# Patient Record
Sex: MALE | Race: WHITE | HISPANIC OR LATINO | URBAN - METROPOLITAN AREA
[De-identification: names, ages, dates, MRNs, and addresses within clinical notes are randomized per-mention and may not be internally consistent; named-entity substitution may affect disease eponyms.]

---

## 2019-12-18 ENCOUNTER — OFFICE VISIT (OUTPATIENT)
Dept: URBAN - METROPOLITAN AREA CLINIC 45 | Facility: CLINIC | Age: 84
End: 2019-12-18
Payer: COMMERCIAL

## 2019-12-18 PROCEDURE — 92133 CPTRZD OPH DX IMG PST SGM ON: CPT | Performed by: OPTOMETRIST

## 2019-12-18 PROCEDURE — 92014 COMPRE OPH EXAM EST PT 1/>: CPT | Performed by: OPTOMETRIST

## 2019-12-18 RX ORDER — BRIMONIDINE TARTRATE, TIMOLOL MALEATE 2; 5 MG/ML; MG/ML
SOLUTION/ DROPS OPHTHALMIC
Qty: 15 | Refills: 1 | Status: INACTIVE
Start: 2019-12-18 | End: 2019-12-26

## 2019-12-18 RX ORDER — BIMATOPROST 0.1 MG/ML
0.01 % SOLUTION/ DROPS OPHTHALMIC
Qty: 7.5 | Refills: 4 | Status: INACTIVE
Start: 2019-12-18 | End: 2020-04-21

## 2019-12-18 RX ORDER — BRINZOLAMIDE 10 MG/ML
1 % SUSPENSION/ DROPS OPHTHALMIC
Qty: 15 | Refills: 3 | Status: INACTIVE
Start: 2019-12-18 | End: 2020-04-13

## 2019-12-18 ASSESSMENT — KERATOMETRY
OS: 43.38
OD: 43.75

## 2019-12-18 ASSESSMENT — INTRAOCULAR PRESSURE
OD: 16
OS: 14

## 2019-12-18 NOTE — IMPRESSION/PLAN
Impression: Primary open-angle glaucoma, bilateral, severe stage: O83.7731.  Plan: AZOPT BID OU, COMBIGAN BID OU, LUMIGAN Q HS OU

## 2020-04-21 ENCOUNTER — OFFICE VISIT (OUTPATIENT)
Dept: URBAN - METROPOLITAN AREA CLINIC 45 | Facility: CLINIC | Age: 85
End: 2020-04-21
Payer: COMMERCIAL

## 2020-04-21 DIAGNOSIS — H40.1133 PRIMARY OPEN-ANGLE GLAUCOMA, BILATERAL, SEVERE STAGE: ICD-10-CM

## 2020-04-21 DIAGNOSIS — H04.123 DRY EYE SYNDROME OF BILATERAL LACRIMAL GLANDS: Primary | ICD-10-CM

## 2020-04-21 PROCEDURE — 99213 OFFICE O/P EST LOW 20 MIN: CPT | Performed by: OPTOMETRIST

## 2020-04-21 RX ORDER — BRINZOLAMIDE 10 MG/ML
1 % SUSPENSION/ DROPS OPHTHALMIC
Qty: 15 | Refills: 6 | Status: INACTIVE
Start: 2020-04-21 | End: 2020-08-25

## 2020-04-21 RX ORDER — BIMATOPROST 0.1 MG/ML
0.01 % SOLUTION/ DROPS OPHTHALMIC
Qty: 7.5 | Refills: 4 | Status: INACTIVE
Start: 2020-04-21 | End: 2020-08-25

## 2020-04-21 RX ORDER — BRIMONIDINE TARTRATE, TIMOLOL MALEATE 2; 5 MG/ML; MG/ML
SOLUTION/ DROPS OPHTHALMIC
Qty: 15 | Refills: 6 | Status: INACTIVE
Start: 2020-04-21 | End: 2020-08-25

## 2020-04-21 ASSESSMENT — INTRAOCULAR PRESSURE
OD: 17
OS: 17

## 2020-04-21 NOTE — IMPRESSION/PLAN
Impression: Primary open-angle glaucoma, bilateral, severe stage: D63.5353.  Plan: azopt tid ou, combigan bid ou, lumigan q hs ou

## 2020-08-25 ENCOUNTER — OFFICE VISIT (OUTPATIENT)
Dept: URBAN - METROPOLITAN AREA CLINIC 45 | Facility: CLINIC | Age: 85
End: 2020-08-25
Payer: COMMERCIAL

## 2020-08-25 PROCEDURE — 99213 OFFICE O/P EST LOW 20 MIN: CPT | Performed by: OPTOMETRIST

## 2020-08-25 PROCEDURE — 92083 EXTENDED VISUAL FIELD XM: CPT | Performed by: OPTOMETRIST

## 2020-08-25 RX ORDER — BIMATOPROST 0.1 MG/ML
0.01 % SOLUTION/ DROPS OPHTHALMIC
Qty: 7.5 | Refills: 4 | Status: INACTIVE
Start: 2020-08-25 | End: 2021-06-09

## 2020-08-25 RX ORDER — BRINZOLAMIDE 10 MG/ML
1 % SUSPENSION/ DROPS OPHTHALMIC
Qty: 15 | Refills: 6 | Status: INACTIVE
Start: 2020-08-25 | End: 2021-06-09

## 2020-08-25 RX ORDER — BRIMONIDINE TARTRATE, TIMOLOL MALEATE 2; 5 MG/ML; MG/ML
SOLUTION/ DROPS OPHTHALMIC
Qty: 15 | Refills: 6 | Status: INACTIVE
Start: 2020-08-25 | End: 2021-06-09

## 2020-08-25 ASSESSMENT — INTRAOCULAR PRESSURE
OD: 21
OS: 15

## 2020-08-25 NOTE — IMPRESSION/PLAN
Impression: Primary open-angle glaucoma, bilateral, severe stage: A19.3451. Plan: azopt tid ou, combigan bid ou, lumigan q hs ou.  VF shows significant progression, iop not at target. pt on max meds. Refer to Dr. Nita Nassar for consult. IOP has not been at target, repeat OCT at next visit, last OCT was stable, question reliability of VF. Given pt's age discussed more drops vs more surgery. Pt hesitant to change treatment.   Recommend consult with Dr. Tiera Philippe

## 2020-08-25 NOTE — ASSESSMENT/PLAN
Impression: Diagnostic Test - Visual Field: Good-possible progression Plan: visual field defect worse

## 2021-06-09 ENCOUNTER — OFFICE VISIT (OUTPATIENT)
Dept: URBAN - METROPOLITAN AREA CLINIC 45 | Facility: CLINIC | Age: 86
End: 2021-06-09
Payer: COMMERCIAL

## 2021-06-09 DIAGNOSIS — H02.054 TRICHIASIS WITHOUT ENTROPION LEFT UPPER EYELID: ICD-10-CM

## 2021-06-09 PROCEDURE — 67820 REVISE EYELASHES: CPT | Performed by: OPTOMETRIST

## 2021-06-09 PROCEDURE — 99214 OFFICE O/P EST MOD 30 MIN: CPT | Performed by: OPTOMETRIST

## 2021-06-09 PROCEDURE — 92133 CPTRZD OPH DX IMG PST SGM ON: CPT | Performed by: OPTOMETRIST

## 2021-06-09 RX ORDER — BRINZOLAMIDE 10 MG/ML
1 % SUSPENSION/ DROPS OPHTHALMIC
Qty: 15 | Refills: 6 | Status: INACTIVE
Start: 2021-06-09 | End: 2021-12-11

## 2021-06-09 RX ORDER — BRIMONIDINE TARTRATE, TIMOLOL MALEATE 2; 5 MG/ML; MG/ML
SOLUTION/ DROPS OPHTHALMIC
Qty: 15 | Refills: 6 | Status: INACTIVE
Start: 2021-06-09 | End: 2022-04-11

## 2021-06-09 RX ORDER — BIMATOPROST 0.1 MG/ML
0.01 % SOLUTION/ DROPS OPHTHALMIC
Qty: 7.5 | Refills: 4 | Status: INACTIVE
Start: 2021-06-09 | End: 2022-04-05

## 2021-06-09 ASSESSMENT — INTRAOCULAR PRESSURE
OS: 16
OD: 16

## 2021-06-09 NOTE — IMPRESSION/PLAN
Impression: Primary open-angle glaucoma, bilateral, severe stage: M58.4639. Plan: OCT thinning OD, Stable, OS thinning worse. IOP better today but NFL is worse in OS, IOP goal under 13. Discussed LASER, pt on max meds and iop not stable. Continue with Azopt BID OU, Combigan BID OU, And Lumigan QHS OU. Rtc with Dr. Manuel Chapman for glc consult.

## 2021-08-31 ENCOUNTER — OFFICE VISIT (OUTPATIENT)
Dept: URBAN - METROPOLITAN AREA CLINIC 45 | Facility: CLINIC | Age: 86
End: 2021-08-31
Payer: COMMERCIAL

## 2021-08-31 PROCEDURE — 99203 OFFICE O/P NEW LOW 30 MIN: CPT | Performed by: OPHTHALMOLOGY

## 2021-08-31 PROCEDURE — 76514 ECHO EXAM OF EYE THICKNESS: CPT | Performed by: OPHTHALMOLOGY

## 2021-08-31 ASSESSMENT — INTRAOCULAR PRESSURE
OS: 11
OD: 20

## 2021-08-31 NOTE — IMPRESSION/PLAN
Impression: Diagnosis: Primary open-angle glaucoma, bilateral, severe stage. Code: X97.5147. S/p Trab OD 10/12, Tmax 22/22, /464, 6/21 OCT 60/53 7/5, GCC 61/61 S/p SLT Plan: Discussed diagnosis with patient. IOP elevated OD. Patient has been instilling IOP gtts OS only. Discussed that IOP gtts should be instilled OU. Written instructions dispensed to patient, reviewed with patient and son. Lumigan QHS OU, Combigan BID OU and Azopt BID OU. Will monitor closely. 2 month IOP check.

## 2021-10-28 ENCOUNTER — OFFICE VISIT (OUTPATIENT)
Dept: URBAN - METROPOLITAN AREA CLINIC 45 | Facility: CLINIC | Age: 86
End: 2021-10-28
Payer: COMMERCIAL

## 2021-10-28 PROCEDURE — 99213 OFFICE O/P EST LOW 20 MIN: CPT | Performed by: OPHTHALMOLOGY

## 2021-10-28 RX ORDER — PREDNISOLONE ACETATE 10 MG/ML
1 % SUSPENSION/ DROPS OPHTHALMIC
Qty: 1 | Refills: 0 | Status: ACTIVE
Start: 2021-10-28

## 2021-10-28 ASSESSMENT — INTRAOCULAR PRESSURE
OS: 18
OD: 18

## 2021-10-28 NOTE — IMPRESSION/PLAN
Impression: Diagnosis: Primary open-angle glaucoma, bilateral, severe stage. Code: G74.2380. S/p Trab OD 10/12, Tmax 22/22, /464, 6/21 OCT 60/53 7/5, GCC 61/61 S/p SLT Plan: Discussed diagnosis with patient. IOP to high for ON appearance. Discussed treatment options. Recommend patient continue Lumigan QHS OU, Combigan BID OU and Azopt BID OU. Discussed the option of the Micropulse G6 Laser procedure to help aid w/ Glaucoma. Recommend Micropulse cytophotocoagulation. R/B/A of procedure discussed. ok for Micropulse G6 OD then OS in ASC, RL1. Pt desires to proceed with procedure.

## 2022-01-25 ENCOUNTER — OFFICE VISIT (OUTPATIENT)
Dept: URBAN - METROPOLITAN AREA CLINIC 45 | Facility: CLINIC | Age: 87
End: 2022-01-25
Payer: COMMERCIAL

## 2022-01-25 PROCEDURE — 99213 OFFICE O/P EST LOW 20 MIN: CPT | Performed by: OPHTHALMOLOGY

## 2022-01-25 ASSESSMENT — INTRAOCULAR PRESSURE
OS: 22
OD: 21

## 2022-01-25 NOTE — IMPRESSION/PLAN
Impression: Diagnosis: Primary open-angle glaucoma, bilateral, severe stage. Code: F57.8943. S/p Trab OD 10/12, Tmax 22/22, /464, 6/21 OCT 60/53 7/5, GCC 61/61 S/p SLT Plan: Discussed diagnosis with patient. IOP to high for ON appearance. No IOP gtts x 2 weeks. Discussed treatment options. Recommend patient restart  Lumigan QHS OU, Combigan BID OU and Azopt BID OU. Stressed compliance. Gave options for second opinion, gave info for Dr Lety Villalta, Dr Hany Donald and Dr Danelle Barber. Discussed the option of the Micropulse G6 Laser procedure to help aid w/ Glaucoma. Recommend Micropulse cytophotocoagulation. R/B/A of procedure discussed. ok for Micropulse G6 OD then OS in ASC, RL1. Pt desires to proceed with procedure.